# Patient Record
Sex: MALE | Race: WHITE | NOT HISPANIC OR LATINO | Employment: FULL TIME | URBAN - METROPOLITAN AREA
[De-identification: names, ages, dates, MRNs, and addresses within clinical notes are randomized per-mention and may not be internally consistent; named-entity substitution may affect disease eponyms.]

---

## 2024-01-12 ENCOUNTER — APPOINTMENT (OUTPATIENT)
Dept: CARDIOLOGY | Facility: HOSPITAL | Age: 44
End: 2024-01-12
Payer: COMMERCIAL

## 2024-01-12 ENCOUNTER — APPOINTMENT (OUTPATIENT)
Dept: RADIOLOGY | Facility: HOSPITAL | Age: 44
End: 2024-01-12
Payer: COMMERCIAL

## 2024-01-12 LAB
ALBUMIN SERPL-MCNC: 4.1 G/DL (ref 3.5–5)
ALP BLD-CCNC: 113 U/L (ref 35–125)
ALT SERPL-CCNC: 23 U/L (ref 5–40)
ANION GAP SERPL CALC-SCNC: 13 MMOL/L
AST SERPL-CCNC: 19 U/L (ref 5–40)
BASOPHILS # BLD AUTO: 0.09 X10*3/UL (ref 0–0.1)
BASOPHILS NFR BLD AUTO: 0.8 %
BILIRUB SERPL-MCNC: <0.2 MG/DL (ref 0.1–1.2)
BUN SERPL-MCNC: 20 MG/DL (ref 8–25)
CALCIUM SERPL-MCNC: 9.6 MG/DL (ref 8.5–10.4)
CHLORIDE SERPL-SCNC: 99 MMOL/L (ref 97–107)
CO2 SERPL-SCNC: 23 MMOL/L (ref 24–31)
CREAT SERPL-MCNC: 1.1 MG/DL (ref 0.4–1.6)
EGFRCR SERPLBLD CKD-EPI 2021: 85 ML/MIN/1.73M*2
EOSINOPHIL # BLD AUTO: 0.23 X10*3/UL (ref 0–0.7)
EOSINOPHIL NFR BLD AUTO: 2.1 %
ERYTHROCYTE [DISTWIDTH] IN BLOOD BY AUTOMATED COUNT: 11.9 % (ref 11.5–14.5)
FLUAV RNA RESP QL NAA+PROBE: NOT DETECTED
FLUBV RNA RESP QL NAA+PROBE: NOT DETECTED
GLUCOSE SERPL-MCNC: 125 MG/DL (ref 65–99)
HCT VFR BLD AUTO: 45.4 % (ref 41–52)
HGB BLD-MCNC: 16.2 G/DL (ref 13.5–17.5)
IMM GRANULOCYTES # BLD AUTO: 0.03 X10*3/UL (ref 0–0.7)
IMM GRANULOCYTES NFR BLD AUTO: 0.3 % (ref 0–0.9)
LACTATE BLDV-SCNC: 1.4 MMOL/L (ref 0.4–2)
LYMPHOCYTES # BLD AUTO: 3.33 X10*3/UL (ref 1.2–4.8)
LYMPHOCYTES NFR BLD AUTO: 29.9 %
MCH RBC QN AUTO: 32.3 PG (ref 26–34)
MCHC RBC AUTO-ENTMCNC: 35.7 G/DL (ref 32–36)
MCV RBC AUTO: 90 FL (ref 80–100)
MONOCYTES # BLD AUTO: 0.76 X10*3/UL (ref 0.1–1)
MONOCYTES NFR BLD AUTO: 6.8 %
NEUTROPHILS # BLD AUTO: 6.71 X10*3/UL (ref 1.2–7.7)
NEUTROPHILS NFR BLD AUTO: 60.1 %
NRBC BLD-RTO: 0 /100 WBCS (ref 0–0)
PLATELET # BLD AUTO: 381 X10*3/UL (ref 150–450)
POTASSIUM SERPL-SCNC: 3.8 MMOL/L (ref 3.4–5.1)
PROT SERPL-MCNC: 7.5 G/DL (ref 5.9–7.9)
RBC # BLD AUTO: 5.02 X10*6/UL (ref 4.5–5.9)
RSV RNA RESP QL NAA+PROBE: NOT DETECTED
SARS-COV-2 RNA RESP QL NAA+PROBE: NOT DETECTED
SODIUM SERPL-SCNC: 135 MMOL/L (ref 133–145)
TROPONIN T SERPL-MCNC: 7 NG/L
WBC # BLD AUTO: 11.2 X10*3/UL (ref 4.4–11.3)

## 2024-01-12 PROCEDURE — 93005 ELECTROCARDIOGRAM TRACING: CPT

## 2024-01-12 PROCEDURE — 2500000004 HC RX 250 GENERAL PHARMACY W/ HCPCS (ALT 636 FOR OP/ED): Performed by: EMERGENCY MEDICINE

## 2024-01-12 PROCEDURE — 87637 SARSCOV2&INF A&B&RSV AMP PRB: CPT | Performed by: EMERGENCY MEDICINE

## 2024-01-12 PROCEDURE — 94640 AIRWAY INHALATION TREATMENT: CPT | Mod: 59

## 2024-01-12 PROCEDURE — 80053 COMPREHEN METABOLIC PANEL: CPT | Performed by: EMERGENCY MEDICINE

## 2024-01-12 PROCEDURE — 71046 X-RAY EXAM CHEST 2 VIEWS: CPT

## 2024-01-12 PROCEDURE — 85025 COMPLETE CBC W/AUTO DIFF WBC: CPT | Performed by: EMERGENCY MEDICINE

## 2024-01-12 PROCEDURE — 83605 ASSAY OF LACTIC ACID: CPT | Performed by: EMERGENCY MEDICINE

## 2024-01-12 PROCEDURE — 87637 SARSCOV2&INF A&B&RSV AMP PRB: CPT | Performed by: STUDENT IN AN ORGANIZED HEALTH CARE EDUCATION/TRAINING PROGRAM

## 2024-01-12 PROCEDURE — 71046 X-RAY EXAM CHEST 2 VIEWS: CPT | Mod: FOREIGN READ | Performed by: RADIOLOGY

## 2024-01-12 PROCEDURE — 36415 COLL VENOUS BLD VENIPUNCTURE: CPT | Performed by: EMERGENCY MEDICINE

## 2024-01-12 PROCEDURE — 84484 ASSAY OF TROPONIN QUANT: CPT | Performed by: EMERGENCY MEDICINE

## 2024-01-12 PROCEDURE — 87040 BLOOD CULTURE FOR BACTERIA: CPT | Mod: WESLAB | Performed by: EMERGENCY MEDICINE

## 2024-01-12 PROCEDURE — 99285 EMERGENCY DEPT VISIT HI MDM: CPT | Mod: 25

## 2024-01-12 PROCEDURE — 2500000001 HC RX 250 WO HCPCS SELF ADMINISTERED DRUGS (ALT 637 FOR MEDICARE OP): Performed by: STUDENT IN AN ORGANIZED HEALTH CARE EDUCATION/TRAINING PROGRAM

## 2024-01-12 PROCEDURE — 99284 EMERGENCY DEPT VISIT MOD MDM: CPT | Performed by: EMERGENCY MEDICINE

## 2024-01-12 RX ORDER — ACETAMINOPHEN 500 MG
1000 TABLET ORAL ONCE
Status: COMPLETED | OUTPATIENT
Start: 2024-01-12 | End: 2024-01-12

## 2024-01-12 RX ADMIN — ACETAMINOPHEN 1000 MG: 500 TABLET ORAL at 19:41

## 2024-01-12 RX ADMIN — PIPERACILLIN SODIUM AND TAZOBACTAM SODIUM 4.5 G: 4; .5 INJECTION, SOLUTION INTRAVENOUS at 22:58

## 2024-01-12 RX ADMIN — DOXYCYCLINE 100 MG: 100 INJECTION, POWDER, LYOPHILIZED, FOR SOLUTION INTRAVENOUS at 23:37

## 2024-01-12 ASSESSMENT — PAIN SCALES - GENERAL
PAINLEVEL_OUTOF10: 4
PAINLEVEL_OUTOF10: 5 - MODERATE PAIN

## 2024-01-12 ASSESSMENT — PAIN - FUNCTIONAL ASSESSMENT
PAIN_FUNCTIONAL_ASSESSMENT: 0-10
PAIN_FUNCTIONAL_ASSESSMENT: 0-10

## 2024-01-12 ASSESSMENT — PAIN SCALES - PAIN ASSESSMENT IN ADVANCED DEMENTIA (PAINAD)
BREATHING: NORMAL
CONSOLABILITY: NO NEED TO CONSOLE
FACIALEXPRESSION: SMILING OR INEXPRESSIVE

## 2024-01-12 ASSESSMENT — PAIN DESCRIPTION - LOCATION: LOCATION: CHEST

## 2024-01-12 ASSESSMENT — PAIN DESCRIPTION - DESCRIPTORS: DESCRIPTORS: ACHING

## 2024-01-12 ASSESSMENT — PAIN SCALES - WONG BAKER: WONGBAKER_NUMERICALRESPONSE: NO HURT

## 2024-01-13 ENCOUNTER — CLINICAL SUPPORT (OUTPATIENT)
Dept: CARDIOLOGY | Facility: CLINIC | Age: 44
End: 2024-01-13
Payer: COMMERCIAL

## 2024-01-13 ENCOUNTER — HOSPITAL ENCOUNTER (EMERGENCY)
Facility: HOSPITAL | Age: 44
Discharge: HOME | End: 2024-01-13
Attending: EMERGENCY MEDICINE
Payer: COMMERCIAL

## 2024-01-13 ENCOUNTER — APPOINTMENT (OUTPATIENT)
Dept: CARDIOLOGY | Facility: HOSPITAL | Age: 44
End: 2024-01-13
Payer: COMMERCIAL

## 2024-01-13 ENCOUNTER — APPOINTMENT (OUTPATIENT)
Dept: RADIOLOGY | Facility: HOSPITAL | Age: 44
End: 2024-01-13
Payer: COMMERCIAL

## 2024-01-13 VITALS
OXYGEN SATURATION: 95 % | WEIGHT: 210 LBS | HEIGHT: 70 IN | BODY MASS INDEX: 30.06 KG/M2 | RESPIRATION RATE: 18 BRPM | HEART RATE: 74 BPM | TEMPERATURE: 100.6 F | SYSTOLIC BLOOD PRESSURE: 125 MMHG | DIASTOLIC BLOOD PRESSURE: 75 MMHG

## 2024-01-13 DIAGNOSIS — J18.9 PNEUMONIA OF LEFT LUNG DUE TO INFECTIOUS ORGANISM, UNSPECIFIED PART OF LUNG: Primary | ICD-10-CM

## 2024-01-13 DIAGNOSIS — J45.20 MILD INTERMITTENT REACTIVE AIRWAY DISEASE WITHOUT COMPLICATION (HHS-HCC): ICD-10-CM

## 2024-01-13 DIAGNOSIS — R07.9 CHEST PAIN, UNSPECIFIED TYPE: ICD-10-CM

## 2024-01-13 DIAGNOSIS — Z72.0 TOBACCO ABUSE: ICD-10-CM

## 2024-01-13 LAB — TROPONIN T SERPL-MCNC: 8 NG/L

## 2024-01-13 PROCEDURE — 71275 CT ANGIOGRAPHY CHEST: CPT | Mod: FOREIGN READ | Performed by: RADIOLOGY

## 2024-01-13 PROCEDURE — 93005 ELECTROCARDIOGRAM TRACING: CPT

## 2024-01-13 PROCEDURE — 96361 HYDRATE IV INFUSION ADD-ON: CPT

## 2024-01-13 PROCEDURE — 2500000002 HC RX 250 W HCPCS SELF ADMINISTERED DRUGS (ALT 637 FOR MEDICARE OP, ALT 636 FOR OP/ED): Performed by: EMERGENCY MEDICINE

## 2024-01-13 PROCEDURE — 96374 THER/PROPH/DIAG INJ IV PUSH: CPT | Mod: 59

## 2024-01-13 PROCEDURE — 2550000001 HC RX 255 CONTRASTS: Performed by: EMERGENCY MEDICINE

## 2024-01-13 PROCEDURE — 2500000004 HC RX 250 GENERAL PHARMACY W/ HCPCS (ALT 636 FOR OP/ED): Performed by: EMERGENCY MEDICINE

## 2024-01-13 PROCEDURE — 2500000001 HC RX 250 WO HCPCS SELF ADMINISTERED DRUGS (ALT 637 FOR MEDICARE OP): Performed by: EMERGENCY MEDICINE

## 2024-01-13 PROCEDURE — 71275 CT ANGIOGRAPHY CHEST: CPT

## 2024-01-13 PROCEDURE — 96375 TX/PRO/DX INJ NEW DRUG ADDON: CPT | Mod: 59

## 2024-01-13 PROCEDURE — 84484 ASSAY OF TROPONIN QUANT: CPT | Performed by: EMERGENCY MEDICINE

## 2024-01-13 PROCEDURE — 36415 COLL VENOUS BLD VENIPUNCTURE: CPT | Performed by: EMERGENCY MEDICINE

## 2024-01-13 RX ORDER — IPRATROPIUM BROMIDE AND ALBUTEROL SULFATE 2.5; .5 MG/3ML; MG/3ML
3 SOLUTION RESPIRATORY (INHALATION) ONCE
Status: COMPLETED | OUTPATIENT
Start: 2024-01-13 | End: 2024-01-13

## 2024-01-13 RX ORDER — DEXAMETHASONE SODIUM PHOSPHATE 100 MG/10ML
10 INJECTION INTRAMUSCULAR; INTRAVENOUS ONCE
Status: COMPLETED | OUTPATIENT
Start: 2024-01-13 | End: 2024-01-13

## 2024-01-13 RX ORDER — METHYLPREDNISOLONE 4 MG/1
TABLET ORAL
Qty: 21 TABLET | Refills: 0 | Status: SHIPPED | OUTPATIENT
Start: 2024-01-13 | End: 2024-01-20

## 2024-01-13 RX ORDER — DOXYCYCLINE 100 MG/1
100 CAPSULE ORAL ONCE
Status: COMPLETED | OUTPATIENT
Start: 2024-01-13 | End: 2024-01-13

## 2024-01-13 RX ORDER — KETOROLAC TROMETHAMINE 30 MG/ML
15 INJECTION, SOLUTION INTRAMUSCULAR; INTRAVENOUS ONCE
Status: COMPLETED | OUTPATIENT
Start: 2024-01-13 | End: 2024-01-13

## 2024-01-13 RX ORDER — DOXYCYCLINE 100 MG/1
100 CAPSULE ORAL 2 TIMES DAILY
Qty: 20 CAPSULE | Refills: 0 | Status: SHIPPED | OUTPATIENT
Start: 2024-01-13 | End: 2024-01-23

## 2024-01-13 RX ORDER — ALBUTEROL SULFATE 90 UG/1
2 AEROSOL, METERED RESPIRATORY (INHALATION) EVERY 4 HOURS PRN
Qty: 18 G | Refills: 0 | Status: SHIPPED | OUTPATIENT
Start: 2024-01-13 | End: 2024-02-12

## 2024-01-13 RX ADMIN — DOXYCYCLINE HYCLATE 100 MG: 100 CAPSULE ORAL at 05:59

## 2024-01-13 RX ADMIN — IPRATROPIUM BROMIDE AND ALBUTEROL SULFATE 3 ML: 2.5; .5 SOLUTION RESPIRATORY (INHALATION) at 03:21

## 2024-01-13 RX ADMIN — IOHEXOL 75 ML: 350 INJECTION, SOLUTION INTRAVENOUS at 04:01

## 2024-01-13 RX ADMIN — KETOROLAC TROMETHAMINE 15 MG: 30 INJECTION INTRAMUSCULAR; INTRAVENOUS at 03:21

## 2024-01-13 RX ADMIN — SODIUM CHLORIDE, SODIUM LACTATE, POTASSIUM CHLORIDE, AND CALCIUM CHLORIDE 1000 ML: 600; 310; 30; 20 INJECTION, SOLUTION INTRAVENOUS at 03:21

## 2024-01-13 RX ADMIN — DEXAMETHASONE SODIUM PHOSPHATE 10 MG: 10 INJECTION INTRAMUSCULAR; INTRAVENOUS at 03:21

## 2024-01-13 ASSESSMENT — PAIN SCALES - GENERAL: PAINLEVEL_OUTOF10: 2

## 2024-01-13 ASSESSMENT — HEART SCORE
HEART SCORE: 2
TROPONIN: LESS THAN OR EQUAL TO NORMAL LIMIT
HISTORY: SLIGHTLY SUSPICIOUS
RISK FACTORS: 1-2 RISK FACTORS
AGE: <45
ECG: NON-SPECIFIC REPOLARIZATION DISTURBANCE

## 2024-01-13 NOTE — ED TRIAGE NOTES
TRIAGE NOTE   I saw the patient as the Clinician in Triage and performed a brief history and physical exam, established acuity, and ordered appropriate tests to develop basic plan of care. Patient will be seen by an [facility], resident and/or physician who will independently evaluate the patient. Please see subsequent provider notes for further details and disposition.     Brief HPI: In brief, Cristobal Singh is a 43 y.o. male that presents for evaluation of chest pain and shortness of breath.  The patient reports a persistent cough for the past 2 weeks.  Cough has been productive of reddish sputum.  He was unaware of any fevers but is found to be febrile here.  Over the past couple of days he has been feeling short of breath and he reports over the past several days constant pain across his chest.  He is a smoker.    Focused Physical exam:   Triage vitals markable for fever and tachycardia as well as tachypnea.  The heart rate is 123 with regular rhythm and no murmurs.  Auscultation of lungs reveal scattered inspiratory and expiratory wheezing that partially clear with cough and deep breath.  Oxygen saturation is marginal at 94%.  Abdomen is soft and nondistended and nontender to palpation.    Plan/MDM:   Plan is for EKG, IV fluids, labs and chest x-ray.  Viral testing also ordered.  Please see subsequent provider note for further details and disposition     Bird Tirado D.O.  7:51 PM

## 2024-01-13 NOTE — DISCHARGE INSTRUCTIONS
Drink fluids.  You can take 2 Motrin or 2 extra strength Tylenol every 6 hours for pain body aches and fever.    Take antibiotics (doxycycline) for 10 days.  Finish the Medrol Dosepak.    Use albuterol inhaler with aero chamber (spacer) every 4-6 hours for cough and/or wheezing.    To the ER if your symptoms do not get better by Monday including difficulty breathing, continued fever or new symptoms like vomiting that you did not have today.

## 2024-01-13 NOTE — Clinical Note
Cristobal Samantha was seen and treated in our emergency department on 1/12/2024.  He may return to work on 01/15/2024.       If you have any questions or concerns, please don't hesitate to call.      Heraclio Dubois MD

## 2024-01-13 NOTE — ED PROVIDER NOTES
HPI   Chief Complaint   Patient presents with    Influenza     Cough cold congestion, chest pressure for 2 weeks.  Active fever.        43-year-old male presents ER with cough, wheezing, not feeling well for 3 weeks cough is productive for pink sputum.  No hematemesis..  Patient smokes.  Denies history of lung disease including asthma COPD or emphysema.  Denies history of CHF.  No leg swelling.  No history of PE or DVT.  No risk factors for PE or DVT including recent surgery, immobilization or cancer.    Patient has had midsternal chest pain for 3 weeks.  Pain is intermittent.  Nonexertional.  Worse with cough.  No history of high cholesterol, hypertension, diabetes.  No cocaine.    Patient has had chills.  No fever except for today at triage.  No nausea vomiting or diarrhea.  No abdominal pain.    Patient has noticed wheezing.  Usually does not have wheezing.  Wheezing present for several days.  Patient has body aches and generalized fatigue.  No headache.  No neck pain.    Patient symptoms been present for 3 weeks.  Worse over the last several days.          History provided by:  Patient                      [place] Coma Scale Score: 15   HEART Score: 2                Patient History   No past medical history on file.  No past surgical history on file.  No family history on file.  Social History     Tobacco Use    Smoking status: Not on file    Smokeless tobacco: Not on file   Substance Use Topics    Alcohol use: Not on file    Drug use: Not on file       Physical Exam   ED Triage Vitals [01/12/24 1933]   Temp Heart Rate Resp BP   38.1 °C (100.6 °F) (!) 123 24 --      SpO2 Temp Source Heart Rate Source Patient Position   94 % Tympanic Monitor --      BP Location FiO2 (%)     -- --       Physical Exam  Vitals and nursing note reviewed.     Constitutional: Well appearing and hydrated. Awake & alert. No acute distress.  Head: Atraumatic.  Eyes: Sclerae are anicteric and not injected.  ENT: Airway is patent.  Neck:  Neck is supple and non-tender. No stridor.  Cardiovascular: Regular rate.  Tachycardia at triage resolved  Pulmonary/Chest: Slight wheezing bilaterally.  Left greater than right.  95% on room air, no hypoxia.  GI: Soft and non-distended. There is no discomfort with palpation.   Extremities: Full range of motion in all extremities and no deformity.  No pedal edema.  No calf tenderness or swelling.  Neurological: Alert, awake.  Moving all extremities.  Skin: Skin is warm and dry.  Psychiatric: Cooperative.    EKG:  interpreted by me, Emergency Department Physician    1.  Sinus tachycardia 122, no ST elevations, QTc 413, no prior  2.  Normal sinus rhythm 84, no ST elevations, decreased heart rate by 40 compared to prior today.    ED Course & MDM   ED Course as of 01/13/24 0607   Fri Jan 12, 2024 1944 My independent interpretation of his EKG shows sinus tachycardia with rate of 122 beats minute.  Rightward axis, QTc is 413 ms, IL interval is 128.  No ST elevation or depression, no acute ischemic pattern.  No STEMI. [NT]   Sat Jan 13, 2024   0258 Troponin negative.  RSV influenza COVID-negative.  White count 11.2, hemoglobin is 16.2.  Electrolytes normal except for bicarb 23 otherwise LFTs and basic metabolic panel negative. [RF]   0330 Tachycardia improved.  No distress.  Patient given IV antibiotics IV fluids and Tylenol with improvement of symptoms. [RF]      ED Course User Index  [NT] Jay Delvalle DO  [RF] Heraclio Dubois MD         Diagnoses as of 01/13/24 0607   Chest pain, unspecified type   Pneumonia of left lung due to infectious organism, unspecified part of lung   Mild intermittent reactive airway disease without complication   Tobacco abuse       Medical Decision Making  Pneumonia: Patient with 3 weeks of cough and now fever and wheezing consistent with pneumonia.  COVID and influenza negative.  Chest x-ray showed possible pneumonia confirmed by CT.  Obtain CT because of patient's new  wheezing, tachycardia and concern for severe infection.  CT confirmed pneumonia mostly in the left lobe.  This is consistent with physical exam and wheezing on the left side.  Patient had symptoms of reactive airway disease with no prior history of asthma or COPD.  Patient smokes.  Patient's wheezing resolved after nebs and steroids.  Patient's abnormal vital signs resolved after treatment of antipyretics and IV fluids.  No tachycardia.  No tachypnea.  No hypoxia.    When patient originally arrived he was febrile and had a heart rate in the 130s.  That resolved after antipyretics.  Meets SIRS criteria for sepsis.  Lactate normal less than 2.  No hypotension.  White count normal.  BMP normal.  No metabolic or electrolyte abnormalities.    Overall after treatment the ER patient is a good candidate for outpatient treatment since he is vitals are normal.  He has no difficulty breathing.  He is able to take oral medications and appears well-hydrated.  Extensively discussed the risks and benefits of going home.  Patient wants to go home and will continue to take oral antibiotics, steroids, use nebs for cough and wheezing.  Offered inpatient admission because of initial vital signs and new reactive airway disease.  Patient declined admission which is reasonable given his current benign physical exam and vitals.      Chest pain: Elated for his chest pain which has been present for 3 weeks.  EKG does not show signs of ischemia.  Troponins are negative.  Heart score is 2.  Pain is worse with coughing.  CT did not show any abnormalities except for pneumonia.  No congestive heart failure or PE.  Chest pain is likely secondary to cough and will improve with oral pain medications including Motrin and Tylenol and improvement of cough with nebs and steroids.    Tobacco abuse: Patient is a smoker and now has reactive airway disease.  Recommended he stop smoking.  Gave patient information on smoking cessation.    I provided at least  75 minutes of critical care time in the treatment of this patient not including teaching or procedures. Patient was emergently evaluated for potential life-threatening conditions to prevent further decompensation, injury, or death. Critical care time included history, physical exam, interpretation of vitals, labs and imaging, discussion of patient with other physicians and frequent monitoring.  Patient evaluated for difficulty breathing, fever, tachycardia possible sepsis.  Treated initially with broad-spectrum antibiotics and IV fluids appropriate for sepsis.  Given nebs and steroids.  Patient's imaging and labs reviewed.  Discussed test results with patient and treatment in the ER and at home.  Patient stable with normal vitals.        Procedure  Procedures     Heraclio Dubois MD  01/13/24 0607

## 2024-01-14 ENCOUNTER — HOSPITAL ENCOUNTER (EMERGENCY)
Facility: HOSPITAL | Age: 44
Discharge: HOME | End: 2024-01-14
Attending: STUDENT IN AN ORGANIZED HEALTH CARE EDUCATION/TRAINING PROGRAM
Payer: COMMERCIAL

## 2024-01-14 ENCOUNTER — APPOINTMENT (OUTPATIENT)
Dept: RADIOLOGY | Facility: HOSPITAL | Age: 44
End: 2024-01-14
Payer: COMMERCIAL

## 2024-01-14 ENCOUNTER — APPOINTMENT (OUTPATIENT)
Dept: CARDIOLOGY | Facility: HOSPITAL | Age: 44
End: 2024-01-14
Payer: COMMERCIAL

## 2024-01-14 VITALS
DIASTOLIC BLOOD PRESSURE: 74 MMHG | WEIGHT: 210 LBS | RESPIRATION RATE: 20 BRPM | HEIGHT: 70 IN | BODY MASS INDEX: 30.06 KG/M2 | OXYGEN SATURATION: 98 % | TEMPERATURE: 99.5 F | HEART RATE: 88 BPM | SYSTOLIC BLOOD PRESSURE: 118 MMHG

## 2024-01-14 DIAGNOSIS — J18.9 PNEUMONIA DUE TO INFECTIOUS ORGANISM, UNSPECIFIED LATERALITY, UNSPECIFIED PART OF LUNG: ICD-10-CM

## 2024-01-14 DIAGNOSIS — R07.9 CHEST PAIN, UNSPECIFIED TYPE: Primary | ICD-10-CM

## 2024-01-14 LAB
ALBUMIN SERPL-MCNC: 4.1 G/DL (ref 3.5–5)
ALP BLD-CCNC: 98 U/L (ref 35–125)
ALT SERPL-CCNC: 21 U/L (ref 5–40)
ANION GAP SERPL CALC-SCNC: 12 MMOL/L
AST SERPL-CCNC: 14 U/L (ref 5–40)
BASOPHILS # BLD AUTO: 0.09 X10*3/UL (ref 0–0.1)
BASOPHILS NFR BLD AUTO: 0.7 %
BILIRUB SERPL-MCNC: <0.2 MG/DL (ref 0.1–1.2)
BUN SERPL-MCNC: 16 MG/DL (ref 8–25)
CALCIUM SERPL-MCNC: 9.4 MG/DL (ref 8.5–10.4)
CHLORIDE SERPL-SCNC: 100 MMOL/L (ref 97–107)
CO2 SERPL-SCNC: 25 MMOL/L (ref 24–31)
CREAT SERPL-MCNC: 0.9 MG/DL (ref 0.4–1.6)
EGFRCR SERPLBLD CKD-EPI 2021: >90 ML/MIN/1.73M*2
EOSINOPHIL # BLD AUTO: 0.12 X10*3/UL (ref 0–0.7)
EOSINOPHIL NFR BLD AUTO: 1 %
ERYTHROCYTE [DISTWIDTH] IN BLOOD BY AUTOMATED COUNT: 12.3 % (ref 11.5–14.5)
GLUCOSE SERPL-MCNC: 155 MG/DL (ref 65–99)
HCT VFR BLD AUTO: 44.1 % (ref 41–52)
HGB BLD-MCNC: 15.3 G/DL (ref 13.5–17.5)
IMM GRANULOCYTES # BLD AUTO: 0.04 X10*3/UL (ref 0–0.7)
IMM GRANULOCYTES NFR BLD AUTO: 0.3 % (ref 0–0.9)
LACTATE BLDV-SCNC: 1.6 MMOL/L (ref 0.4–2)
LYMPHOCYTES # BLD AUTO: 3.34 X10*3/UL (ref 1.2–4.8)
LYMPHOCYTES NFR BLD AUTO: 27.5 %
MAGNESIUM SERPL-MCNC: 2.1 MG/DL (ref 1.6–3.1)
MCH RBC QN AUTO: 31.8 PG (ref 26–34)
MCHC RBC AUTO-ENTMCNC: 34.7 G/DL (ref 32–36)
MCV RBC AUTO: 92 FL (ref 80–100)
MONOCYTES # BLD AUTO: 0.53 X10*3/UL (ref 0.1–1)
MONOCYTES NFR BLD AUTO: 4.4 %
NEUTROPHILS # BLD AUTO: 8.01 X10*3/UL (ref 1.2–7.7)
NEUTROPHILS NFR BLD AUTO: 66.1 %
NRBC BLD-RTO: 0 /100 WBCS (ref 0–0)
NT-PROBNP SERPL-MCNC: 54 PG/ML (ref 0–93)
PLATELET # BLD AUTO: 397 X10*3/UL (ref 150–450)
POTASSIUM SERPL-SCNC: 3.4 MMOL/L (ref 3.4–5.1)
PROT SERPL-MCNC: 7.3 G/DL (ref 5.9–7.9)
RBC # BLD AUTO: 4.81 X10*6/UL (ref 4.5–5.9)
SODIUM SERPL-SCNC: 137 MMOL/L (ref 133–145)
TROPONIN T SERPL-MCNC: 8 NG/L
WBC # BLD AUTO: 12.1 X10*3/UL (ref 4.4–11.3)

## 2024-01-14 PROCEDURE — 96361 HYDRATE IV INFUSION ADD-ON: CPT

## 2024-01-14 PROCEDURE — 83880 ASSAY OF NATRIURETIC PEPTIDE: CPT | Performed by: STUDENT IN AN ORGANIZED HEALTH CARE EDUCATION/TRAINING PROGRAM

## 2024-01-14 PROCEDURE — 2500000002 HC RX 250 W HCPCS SELF ADMINISTERED DRUGS (ALT 637 FOR MEDICARE OP, ALT 636 FOR OP/ED): Performed by: STUDENT IN AN ORGANIZED HEALTH CARE EDUCATION/TRAINING PROGRAM

## 2024-01-14 PROCEDURE — 80053 COMPREHEN METABOLIC PANEL: CPT | Performed by: STUDENT IN AN ORGANIZED HEALTH CARE EDUCATION/TRAINING PROGRAM

## 2024-01-14 PROCEDURE — 85025 COMPLETE CBC W/AUTO DIFF WBC: CPT | Performed by: STUDENT IN AN ORGANIZED HEALTH CARE EDUCATION/TRAINING PROGRAM

## 2024-01-14 PROCEDURE — 99284 EMERGENCY DEPT VISIT MOD MDM: CPT | Performed by: STUDENT IN AN ORGANIZED HEALTH CARE EDUCATION/TRAINING PROGRAM

## 2024-01-14 PROCEDURE — 84484 ASSAY OF TROPONIN QUANT: CPT | Performed by: STUDENT IN AN ORGANIZED HEALTH CARE EDUCATION/TRAINING PROGRAM

## 2024-01-14 PROCEDURE — 83605 ASSAY OF LACTIC ACID: CPT | Performed by: STUDENT IN AN ORGANIZED HEALTH CARE EDUCATION/TRAINING PROGRAM

## 2024-01-14 PROCEDURE — 93005 ELECTROCARDIOGRAM TRACING: CPT

## 2024-01-14 PROCEDURE — 36415 COLL VENOUS BLD VENIPUNCTURE: CPT | Performed by: STUDENT IN AN ORGANIZED HEALTH CARE EDUCATION/TRAINING PROGRAM

## 2024-01-14 PROCEDURE — 96374 THER/PROPH/DIAG INJ IV PUSH: CPT

## 2024-01-14 PROCEDURE — 71046 X-RAY EXAM CHEST 2 VIEWS: CPT

## 2024-01-14 PROCEDURE — 2500000001 HC RX 250 WO HCPCS SELF ADMINISTERED DRUGS (ALT 637 FOR MEDICARE OP): Performed by: STUDENT IN AN ORGANIZED HEALTH CARE EDUCATION/TRAINING PROGRAM

## 2024-01-14 PROCEDURE — 94640 AIRWAY INHALATION TREATMENT: CPT

## 2024-01-14 PROCEDURE — 83735 ASSAY OF MAGNESIUM: CPT | Performed by: STUDENT IN AN ORGANIZED HEALTH CARE EDUCATION/TRAINING PROGRAM

## 2024-01-14 PROCEDURE — 2500000004 HC RX 250 GENERAL PHARMACY W/ HCPCS (ALT 636 FOR OP/ED): Performed by: STUDENT IN AN ORGANIZED HEALTH CARE EDUCATION/TRAINING PROGRAM

## 2024-01-14 RX ORDER — IPRATROPIUM BROMIDE AND ALBUTEROL SULFATE 2.5; .5 MG/3ML; MG/3ML
3 SOLUTION RESPIRATORY (INHALATION) ONCE
Status: COMPLETED | OUTPATIENT
Start: 2024-01-14 | End: 2024-01-14

## 2024-01-14 RX ORDER — ACETAMINOPHEN AND CODEINE PHOSPHATE 300; 30 MG/1; MG/1
1 TABLET ORAL ONCE
Status: COMPLETED | OUTPATIENT
Start: 2024-01-14 | End: 2024-01-14

## 2024-01-14 RX ORDER — KETOROLAC TROMETHAMINE 30 MG/ML
15 INJECTION, SOLUTION INTRAMUSCULAR; INTRAVENOUS ONCE
Status: COMPLETED | OUTPATIENT
Start: 2024-01-14 | End: 2024-01-14

## 2024-01-14 RX ORDER — AMOXICILLIN AND CLAVULANATE POTASSIUM 875; 125 MG/1; MG/1
1 TABLET, FILM COATED ORAL ONCE
Status: COMPLETED | OUTPATIENT
Start: 2024-01-14 | End: 2024-01-14

## 2024-01-14 RX ORDER — AMOXICILLIN AND CLAVULANATE POTASSIUM 875; 125 MG/1; MG/1
1 TABLET, FILM COATED ORAL EVERY 12 HOURS
Qty: 13 TABLET | Refills: 0 | Status: SHIPPED | OUTPATIENT
Start: 2024-01-14 | End: 2024-01-21

## 2024-01-14 RX ORDER — ACETAMINOPHEN AND CODEINE PHOSPHATE 300; 30 MG/1; MG/1
1 TABLET ORAL EVERY 6 HOURS PRN
Qty: 12 TABLET | Refills: 0 | Status: SHIPPED | OUTPATIENT
Start: 2024-01-14 | End: 2024-01-17

## 2024-01-14 RX ADMIN — SODIUM CHLORIDE 1000 ML: 900 INJECTION, SOLUTION INTRAVENOUS at 19:29

## 2024-01-14 RX ADMIN — AMOXICILLIN AND CLAVULANATE POTASSIUM 875 MG: 875; 125 TABLET, FILM COATED ORAL at 20:39

## 2024-01-14 RX ADMIN — ACETAMINOPHEN AND CODEINE PHOSPHATE 1 TABLET: 300; 30 TABLET ORAL at 19:30

## 2024-01-14 RX ADMIN — IPRATROPIUM BROMIDE AND ALBUTEROL SULFATE 3 ML: .5; 2.5 SOLUTION RESPIRATORY (INHALATION) at 20:13

## 2024-01-14 RX ADMIN — KETOROLAC TROMETHAMINE 15 MG: 30 INJECTION INTRAMUSCULAR; INTRAVENOUS at 19:30

## 2024-01-14 ASSESSMENT — PAIN DESCRIPTION - PROGRESSION: CLINICAL_PROGRESSION: NOT CHANGED

## 2024-01-14 ASSESSMENT — PAIN DESCRIPTION - ONSET: ONSET: ONGOING

## 2024-01-14 ASSESSMENT — PAIN DESCRIPTION - DESCRIPTORS
DESCRIPTORS: PRESSURE

## 2024-01-14 ASSESSMENT — COLUMBIA-SUICIDE SEVERITY RATING SCALE - C-SSRS
2. HAVE YOU ACTUALLY HAD ANY THOUGHTS OF KILLING YOURSELF?: NO
1. IN THE PAST MONTH, HAVE YOU WISHED YOU WERE DEAD OR WISHED YOU COULD GO TO SLEEP AND NOT WAKE UP?: NO
6. HAVE YOU EVER DONE ANYTHING, STARTED TO DO ANYTHING, OR PREPARED TO DO ANYTHING TO END YOUR LIFE?: NO

## 2024-01-14 ASSESSMENT — LIFESTYLE VARIABLES
HAVE PEOPLE ANNOYED YOU BY CRITICIZING YOUR DRINKING: NO
EVER HAD A DRINK FIRST THING IN THE MORNING TO STEADY YOUR NERVES TO GET RID OF A HANGOVER: NO
HAVE YOU EVER FELT YOU SHOULD CUT DOWN ON YOUR DRINKING: NO
EVER FELT BAD OR GUILTY ABOUT YOUR DRINKING: NO
REASON UNABLE TO ASSESS: NO

## 2024-01-14 ASSESSMENT — PAIN - FUNCTIONAL ASSESSMENT
PAIN_FUNCTIONAL_ASSESSMENT: 0-10
PAIN_FUNCTIONAL_ASSESSMENT: 0-10

## 2024-01-14 ASSESSMENT — PAIN DESCRIPTION - LOCATION: LOCATION: CHEST

## 2024-01-14 ASSESSMENT — PAIN DESCRIPTION - PAIN TYPE: TYPE: ACUTE PAIN

## 2024-01-14 ASSESSMENT — PAIN SCALES - GENERAL
PAINLEVEL_OUTOF10: 7
PAINLEVEL_OUTOF10: 4
PAINLEVEL_OUTOF10: 7

## 2024-01-14 ASSESSMENT — PAIN DESCRIPTION - FREQUENCY: FREQUENCY: CONSTANT/CONTINUOUS

## 2024-01-14 NOTE — Clinical Note
Cristobal Samantha was seen and treated in our emergency department on 1/14/2024.  He may return to work on 01/17/2024.       If you have any questions or concerns, please don't hesitate to call.      John Bland MD

## 2024-01-15 LAB
ATRIAL RATE: 102 BPM
ATRIAL RATE: 122 BPM
ATRIAL RATE: 84 BPM
P AXIS: 60 DEGREES
P AXIS: 67 DEGREES
P AXIS: 77 DEGREES
P OFFSET: 198 MS
P OFFSET: 202 MS
P OFFSET: 208 MS
P ONSET: 147 MS
P ONSET: 153 MS
P ONSET: 160 MS
PR INTERVAL: 128 MS
PR INTERVAL: 138 MS
PR INTERVAL: 154 MS
Q ONSET: 222 MS
Q ONSET: 224 MS
Q ONSET: 224 MS
QRS COUNT: 14 BEATS
QRS COUNT: 17 BEATS
QRS COUNT: 20 BEATS
QRS DURATION: 84 MS
QRS DURATION: 90 MS
QRS DURATION: 90 MS
QT INTERVAL: 290 MS
QT INTERVAL: 340 MS
QT INTERVAL: 370 MS
QTC CALCULATION(BAZETT): 413 MS
QTC CALCULATION(BAZETT): 437 MS
QTC CALCULATION(BAZETT): 443 MS
QTC FREDERICIA: 367 MS
QTC FREDERICIA: 405 MS
QTC FREDERICIA: 413 MS
R AXIS: 53 DEGREES
R AXIS: 81 DEGREES
R AXIS: 96 DEGREES
T AXIS: 45 DEGREES
T AXIS: 55 DEGREES
T AXIS: 64 DEGREES
T OFFSET: 369 MS
T OFFSET: 392 MS
T OFFSET: 409 MS
VENTRICULAR RATE: 102 BPM
VENTRICULAR RATE: 122 BPM
VENTRICULAR RATE: 84 BPM

## 2024-01-15 NOTE — DISCHARGE INSTRUCTIONS
Return to the ER if you develop difficulty breathing, symptoms do not improve after 48 hours of antibiotics, chest pain with exertion, or if you develop any other new / concerning symptoms.

## 2024-01-15 NOTE — ED PROVIDER NOTES
HPI   Chief Complaint   Patient presents with    Chest Pain     Pt diagnosed with pneumonia 2 days ago. Pt states he is having constant chest pressure that radiates under right armpit. 7/10.       HPI     Pt presents for eval persistent Sx since being Dxed w/ PNA 2 days prior to eval. States still having persistent cough which causes R sided chest pain. Denies any exertional CP or dyspnea. Has since had 3 doses abx. Denies any LE edema or orthopnea. Denies past MHx               [place] Coma Scale Score: 15   HEART Score: 2                Patient History   No past medical history on file.  No past surgical history on file.  No family history on file.  Social History     Tobacco Use    Smoking status: Not on file    Smokeless tobacco: Not on file   Substance Use Topics    Alcohol use: Not on file    Drug use: Not on file       Physical Exam   ED Triage Vitals [01/14/24 1830]   Temp Heart Rate Resp BP   37.5 °C (99.5 °F) 107 22 121/78      SpO2 Temp Source Heart Rate Source Patient Position   98 % Tympanic -- Sitting      BP Location FiO2 (%)     Left arm --       Physical Exam  Vitals and nursing note reviewed.   Constitutional:       General: He is not in acute distress.     Appearance: He is well-developed.   HENT:      Head: Normocephalic and atraumatic.   Eyes:      Conjunctiva/sclera: Conjunctivae normal.   Cardiovascular:      Rate and Rhythm: Normal rate and regular rhythm.      Pulses:           Radial pulses are 2+ on the right side and 2+ on the left side.      Heart sounds: No murmur heard.  Pulmonary:      Effort: Pulmonary effort is normal. No accessory muscle usage or respiratory distress.      Breath sounds: Examination of the right-lower field reveals rales. Rales present.      Comments: Speaking in full sentences   Abdominal:      Palpations: Abdomen is soft.      Tenderness: There is no abdominal tenderness.   Musculoskeletal:         General: No swelling.      Cervical back: Neck supple.   Skin:      General: Skin is warm and dry.      Capillary Refill: Capillary refill takes less than 2 seconds.   Neurological:      Mental Status: He is alert.   Psychiatric:         Mood and Affect: Mood normal.         ED Course & MDM   ED Course as of 01/19/24 1259   Sun Jan 14, 2024   1840 EKG w/ sinus tachy, rate 102, no significant STTW changes. No stemi  [JH]      ED Course User Index  [JH] John Bland MD         Diagnoses as of 01/19/24 1259   Chest pain, unspecified type   Pneumonia due to infectious organism, unspecified laterality, unspecified part of lung       Medical Decision Making    Pt well on exam   VSS   Saturating 98% on RA   Lungs w/ R sided rales.   ON chart review pt underwent CT which showed c/f PNA. Currently on doxy. Will expand coverage w/ augmentin. Provided anti tussive in ED w/ improvement in Sx. Cardiac BURTON reassuring, lower suspicion ACS as Sx non exertional and largely related to persistent cough. Reports improvement in Sx after NSAIDs and anti tussive. Pt Dced home. Plan for close outpt FU. RTER precautions dw Pt. Pt verbalized understanding w/ plan.  Procedure  Procedures     Jhon Bland MD  01/19/24 1310       John Bland MD  01/19/24 1325

## 2024-01-17 LAB
BACTERIA BLD CULT: NORMAL
BACTERIA BLD CULT: NORMAL

## 2025-06-19 ENCOUNTER — APPOINTMENT (OUTPATIENT)
Dept: RADIOLOGY | Facility: HOSPITAL | Age: 45
End: 2025-06-19
Payer: COMMERCIAL

## 2025-06-19 ENCOUNTER — APPOINTMENT (OUTPATIENT)
Dept: CARDIOLOGY | Facility: HOSPITAL | Age: 45
End: 2025-06-19
Payer: COMMERCIAL

## 2025-06-19 ENCOUNTER — OFFICE VISIT (OUTPATIENT)
Dept: URGENT CARE | Age: 45
End: 2025-06-19
Payer: COMMERCIAL

## 2025-06-19 ENCOUNTER — HOSPITAL ENCOUNTER (EMERGENCY)
Facility: HOSPITAL | Age: 45
Discharge: HOME | End: 2025-06-19
Attending: EMERGENCY MEDICINE
Payer: COMMERCIAL

## 2025-06-19 VITALS
OXYGEN SATURATION: 98 % | SYSTOLIC BLOOD PRESSURE: 136 MMHG | RESPIRATION RATE: 20 BRPM | DIASTOLIC BLOOD PRESSURE: 90 MMHG | HEART RATE: 67 BPM | TEMPERATURE: 97.8 F

## 2025-06-19 VITALS
OXYGEN SATURATION: 99 % | RESPIRATION RATE: 18 BRPM | WEIGHT: 202 LBS | BODY MASS INDEX: 28.92 KG/M2 | SYSTOLIC BLOOD PRESSURE: 140 MMHG | TEMPERATURE: 97.9 F | HEART RATE: 77 BPM | HEIGHT: 70 IN | DIASTOLIC BLOOD PRESSURE: 103 MMHG

## 2025-06-19 DIAGNOSIS — K65.4 MESENTERIC PANNICULITIS (MULTI): ICD-10-CM

## 2025-06-19 DIAGNOSIS — I10 DIASTOLIC HYPERTENSION: ICD-10-CM

## 2025-06-19 DIAGNOSIS — R53.81 MALAISE AND FATIGUE: ICD-10-CM

## 2025-06-19 DIAGNOSIS — R07.9 CHEST PAIN, UNSPECIFIED TYPE: Primary | ICD-10-CM

## 2025-06-19 DIAGNOSIS — R11.10 VOMITING AND DIARRHEA: ICD-10-CM

## 2025-06-19 DIAGNOSIS — R53.83 MALAISE AND FATIGUE: ICD-10-CM

## 2025-06-19 DIAGNOSIS — R19.7 VOMITING AND DIARRHEA: ICD-10-CM

## 2025-06-19 LAB
ALBUMIN SERPL BCP-MCNC: 4.3 G/DL (ref 3.4–5)
ALP SERPL-CCNC: 75 U/L (ref 33–120)
ALT SERPL W P-5'-P-CCNC: 29 U/L (ref 10–52)
ANION GAP SERPL CALCULATED.3IONS-SCNC: 10 MMOL/L (ref 10–20)
APPEARANCE UR: CLEAR
AST SERPL W P-5'-P-CCNC: 19 U/L (ref 9–39)
ATRIAL RATE: 67 BPM
BASOPHILS # BLD AUTO: 0.06 X10*3/UL (ref 0–0.1)
BASOPHILS NFR BLD AUTO: 0.6 %
BILIRUB SERPL-MCNC: 0.6 MG/DL (ref 0–1.2)
BILIRUB UR STRIP.AUTO-MCNC: NEGATIVE MG/DL
BNP SERPL-MCNC: 19 PG/ML (ref 0–99)
BUN SERPL-MCNC: 18 MG/DL (ref 6–23)
CALCIUM SERPL-MCNC: 8.7 MG/DL (ref 8.6–10.3)
CARDIAC TROPONIN I PNL SERPL HS: 3 NG/L (ref 0–20)
CARDIAC TROPONIN I PNL SERPL HS: <3 NG/L (ref 0–20)
CHLORIDE SERPL-SCNC: 102 MMOL/L (ref 98–107)
CO2 SERPL-SCNC: 26 MMOL/L (ref 21–32)
COLOR UR: COLORLESS
CREAT SERPL-MCNC: 0.8 MG/DL (ref 0.5–1.3)
EGFRCR SERPLBLD CKD-EPI 2021: >90 ML/MIN/1.73M*2
EOSINOPHIL # BLD AUTO: 0.05 X10*3/UL (ref 0–0.7)
EOSINOPHIL NFR BLD AUTO: 0.5 %
ERYTHROCYTE [DISTWIDTH] IN BLOOD BY AUTOMATED COUNT: 12.3 % (ref 11.5–14.5)
GLUCOSE SERPL-MCNC: 98 MG/DL (ref 74–99)
GLUCOSE UR STRIP.AUTO-MCNC: NORMAL MG/DL
HCT VFR BLD AUTO: 45.2 % (ref 41–52)
HGB BLD-MCNC: 15.8 G/DL (ref 13.5–17.5)
IMM GRANULOCYTES # BLD AUTO: 0.03 X10*3/UL (ref 0–0.7)
IMM GRANULOCYTES NFR BLD AUTO: 0.3 % (ref 0–0.9)
KETONES UR STRIP.AUTO-MCNC: NEGATIVE MG/DL
LEUKOCYTE ESTERASE UR QL STRIP.AUTO: NEGATIVE
LIPASE SERPL-CCNC: 14 U/L (ref 9–82)
LYMPHOCYTES # BLD AUTO: 1.41 X10*3/UL (ref 1.2–4.8)
LYMPHOCYTES NFR BLD AUTO: 14.3 %
MCH RBC QN AUTO: 32 PG (ref 26–34)
MCHC RBC AUTO-ENTMCNC: 35 G/DL (ref 32–36)
MCV RBC AUTO: 92 FL (ref 80–100)
MONOCYTES # BLD AUTO: 0.47 X10*3/UL (ref 0.1–1)
MONOCYTES NFR BLD AUTO: 4.8 %
NEUTROPHILS # BLD AUTO: 7.87 X10*3/UL (ref 1.2–7.7)
NEUTROPHILS NFR BLD AUTO: 79.5 %
NITRITE UR QL STRIP.AUTO: NEGATIVE
NRBC BLD-RTO: 0 /100 WBCS (ref 0–0)
P AXIS: 77 DEGREES
P OFFSET: 201 MS
P ONSET: 152 MS
PH UR STRIP.AUTO: 5.5 [PH]
PLATELET # BLD AUTO: 240 X10*3/UL (ref 150–450)
POTASSIUM SERPL-SCNC: 3.9 MMOL/L (ref 3.5–5.3)
PR INTERVAL: 146 MS
PROT SERPL-MCNC: 6.7 G/DL (ref 6.4–8.2)
PROT UR STRIP.AUTO-MCNC: NEGATIVE MG/DL
Q ONSET: 225 MS
QRS COUNT: 11 BEATS
QRS DURATION: 86 MS
QT INTERVAL: 384 MS
QTC CALCULATION(BAZETT): 405 MS
QTC FREDERICIA: 398 MS
R AXIS: 55 DEGREES
RBC # BLD AUTO: 4.94 X10*6/UL (ref 4.5–5.9)
RBC # UR STRIP.AUTO: NEGATIVE MG/DL
SODIUM SERPL-SCNC: 134 MMOL/L (ref 136–145)
SP GR UR STRIP.AUTO: 1.01
T AXIS: 63 DEGREES
T OFFSET: 417 MS
UROBILINOGEN UR STRIP.AUTO-MCNC: NORMAL MG/DL
VENTRICULAR RATE: 67 BPM
WBC # BLD AUTO: 9.9 X10*3/UL (ref 4.4–11.3)

## 2025-06-19 PROCEDURE — 71275 CT ANGIOGRAPHY CHEST: CPT

## 2025-06-19 PROCEDURE — 80053 COMPREHEN METABOLIC PANEL: CPT | Performed by: EMERGENCY MEDICINE

## 2025-06-19 PROCEDURE — 96361 HYDRATE IV INFUSION ADD-ON: CPT

## 2025-06-19 PROCEDURE — 84484 ASSAY OF TROPONIN QUANT: CPT | Performed by: EMERGENCY MEDICINE

## 2025-06-19 PROCEDURE — 2500000004 HC RX 250 GENERAL PHARMACY W/ HCPCS (ALT 636 FOR OP/ED): Performed by: EMERGENCY MEDICINE

## 2025-06-19 PROCEDURE — 74177 CT ABD & PELVIS W/CONTRAST: CPT | Performed by: STUDENT IN AN ORGANIZED HEALTH CARE EDUCATION/TRAINING PROGRAM

## 2025-06-19 PROCEDURE — 85025 COMPLETE CBC W/AUTO DIFF WBC: CPT | Performed by: EMERGENCY MEDICINE

## 2025-06-19 PROCEDURE — 36415 COLL VENOUS BLD VENIPUNCTURE: CPT | Performed by: EMERGENCY MEDICINE

## 2025-06-19 PROCEDURE — 93005 ELECTROCARDIOGRAM TRACING: CPT

## 2025-06-19 PROCEDURE — 71275 CT ANGIOGRAPHY CHEST: CPT | Performed by: STUDENT IN AN ORGANIZED HEALTH CARE EDUCATION/TRAINING PROGRAM

## 2025-06-19 PROCEDURE — 96375 TX/PRO/DX INJ NEW DRUG ADDON: CPT | Mod: 59

## 2025-06-19 PROCEDURE — 99285 EMERGENCY DEPT VISIT HI MDM: CPT | Mod: 25 | Performed by: EMERGENCY MEDICINE

## 2025-06-19 PROCEDURE — 83880 ASSAY OF NATRIURETIC PEPTIDE: CPT | Performed by: EMERGENCY MEDICINE

## 2025-06-19 PROCEDURE — 74177 CT ABD & PELVIS W/CONTRAST: CPT

## 2025-06-19 PROCEDURE — 83690 ASSAY OF LIPASE: CPT | Performed by: EMERGENCY MEDICINE

## 2025-06-19 PROCEDURE — 96374 THER/PROPH/DIAG INJ IV PUSH: CPT | Mod: 59

## 2025-06-19 PROCEDURE — 2550000001 HC RX 255 CONTRASTS: Performed by: EMERGENCY MEDICINE

## 2025-06-19 PROCEDURE — 81003 URINALYSIS AUTO W/O SCOPE: CPT | Performed by: EMERGENCY MEDICINE

## 2025-06-19 RX ORDER — FAMOTIDINE 20 MG/1
20 TABLET, FILM COATED ORAL 2 TIMES DAILY
Qty: 20 TABLET | Refills: 0 | Status: SHIPPED | OUTPATIENT
Start: 2025-06-19 | End: 2025-07-03

## 2025-06-19 RX ORDER — KETOROLAC TROMETHAMINE 15 MG/ML
15 INJECTION, SOLUTION INTRAMUSCULAR; INTRAVENOUS ONCE
Status: COMPLETED | OUTPATIENT
Start: 2025-06-19 | End: 2025-06-19

## 2025-06-19 RX ORDER — ONDANSETRON 4 MG/1
4 TABLET, ORALLY DISINTEGRATING ORAL EVERY 8 HOURS PRN
Qty: 10 TABLET | Refills: 0 | Status: SHIPPED | OUTPATIENT
Start: 2025-06-19 | End: 2025-06-22

## 2025-06-19 RX ORDER — IBUPROFEN 600 MG/1
600 TABLET, FILM COATED ORAL EVERY 6 HOURS PRN
Qty: 20 TABLET | Refills: 0 | Status: SHIPPED | OUTPATIENT
Start: 2025-06-19 | End: 2025-06-24

## 2025-06-19 RX ORDER — ONDANSETRON HYDROCHLORIDE 2 MG/ML
4 INJECTION, SOLUTION INTRAVENOUS ONCE
Status: COMPLETED | OUTPATIENT
Start: 2025-06-19 | End: 2025-06-19

## 2025-06-19 RX ORDER — FAMOTIDINE 10 MG/ML
20 INJECTION, SOLUTION INTRAVENOUS ONCE
Status: COMPLETED | OUTPATIENT
Start: 2025-06-19 | End: 2025-06-19

## 2025-06-19 RX ADMIN — IOHEXOL 75 ML: 350 INJECTION, SOLUTION INTRAVENOUS at 11:37

## 2025-06-19 RX ADMIN — IOHEXOL 50 ML: 350 INJECTION, SOLUTION INTRAVENOUS at 12:55

## 2025-06-19 RX ADMIN — SODIUM CHLORIDE 1000 ML: 900 INJECTION, SOLUTION INTRAVENOUS at 11:10

## 2025-06-19 RX ADMIN — KETOROLAC TROMETHAMINE 15 MG: 15 INJECTION, SOLUTION INTRAMUSCULAR; INTRAVENOUS at 11:10

## 2025-06-19 RX ADMIN — FAMOTIDINE 20 MG: 10 INJECTION, SOLUTION INTRAVENOUS at 11:10

## 2025-06-19 RX ADMIN — ONDANSETRON 4 MG: 2 INJECTION, SOLUTION INTRAMUSCULAR; INTRAVENOUS at 11:10

## 2025-06-19 ASSESSMENT — ENCOUNTER SYMPTOMS
VOMITING: 1
NAUSEA: 1
DIARRHEA: 1

## 2025-06-19 ASSESSMENT — PATIENT HEALTH QUESTIONNAIRE - PHQ9
SUM OF ALL RESPONSES TO PHQ9 QUESTIONS 1 AND 2: 0
1. LITTLE INTEREST OR PLEASURE IN DOING THINGS: NOT AT ALL
2. FEELING DOWN, DEPRESSED OR HOPELESS: NOT AT ALL

## 2025-06-19 NOTE — PROGRESS NOTES
Subjective   Patient ID: Cristobal Singh is a 44 y.o. male. They present today with a chief complaint of Chest Pain (And vomiting. 2 days).    History of Present Illness  Cristobal Singh is a 44 y.o. male who presents to the clinic for 2 days of left sided chest pain.  Patient states the pain sometimes radiates to the back.  Patient states he has had associated nausea vomiting and diarrhea over the past 2 days.  Chest pain is nonreproducible.  Pt denies any chest pain, sob, N/V at this time in clinic.             Past Medical History  Allergies as of 06/19/2025    (No Known Allergies)       Prescriptions Prior to Admission[1]     Medical History[2]    Surgical History[3]     reports that he has been smoking cigarettes. He has never used smokeless tobacco.    Review of Systems  Review of Systems   Cardiovascular:  Positive for chest pain.   Gastrointestinal:  Positive for diarrhea, nausea and vomiting.   All other systems reviewed and are negative.                                 Objective    Vitals:    06/19/25 1003   BP: 136/90   Pulse: 67   Resp: 20   Temp: 36.6 °C (97.8 °F)   SpO2: 98%     No LMP for male patient.    Physical Exam  Constitutional:       Appearance: Normal appearance.   HENT:      Right Ear: Tympanic membrane normal.      Left Ear: Tympanic membrane normal.   Cardiovascular:      Rate and Rhythm: Normal rate and regular rhythm.   Pulmonary:      Effort: Pulmonary effort is normal.      Breath sounds: Normal breath sounds.   Neurological:      General: No focal deficit present.      Mental Status: He is alert and oriented to person, place, and time. Mental status is at baseline.   Psychiatric:         Mood and Affect: Mood normal.         Behavior: Behavior normal.         Procedures    Point of Care Test & Imaging Results from this visit  No results found for this visit on 06/19/25.   Imaging  No results found.    Cardiology, Vascular, and Other Imaging  No other imaging results found for  the past 2 days      Diagnostic study results (if any) were reviewed by CHAI Frey.    Assessment/Plan   Allergies, medications, history, and pertinent labs/EKGs/Imaging reviewed by CHAI Frey.     Medical Decision Making  History and physical exam of suggestive of possible cardiac abnormality. Case and EKG reviewed with supervising physician. Patient verbalized understanding of emergent need for evaluation. Patient is transferred to ER for further evaluation and care. Case and EKG reviewed with supervising doc, Dr. Crotez.   Advised patient I would call EMS to transport patient to ER.  Patient declined.  Patient signed AMA form.  Please see signed form.  Patient has capacity make informed medical decisions.      Orders and Diagnoses  Diagnoses and all orders for this visit:  Chest pain, unspecified type      Medical Admin Record      Patient disposition: ED    Electronically signed by CHAI Frey  10:28 AM           [1] (Not in a hospital admission)   [2] No past medical history on file.  [3] No past surgical history on file.

## 2025-06-19 NOTE — ED TRIAGE NOTES
Nausea vomiting and Diarrhea started Wednesday. Pt now complaining of chest pain when coughing and having back pain between the shoulders

## 2025-06-19 NOTE — PROGRESS NOTES
Spiritual Care Visit  Spiritual Care Request    Reason for Visit:  Routine Visit: Introduction  Crisis Visit: ED     Request Received From:       Focus of Care:  Visited With: Patient         Refer to :          Spiritual Care Assessment    Spiritual Assessment:                      Care Provided:       Sense of Community and or Yazdanism Affiliation:  Yazdanism   Values/Beliefs  Spiritual Requests During Hospitalization: Reginaldo could not be seen today.     Addressed Needs/Concerns and/or Scarlett Through:          Outcome:        Advance Directives:         Spiritual Care Annotation    Annotation:  Reginaldo could not be seen today.  Michael Marie

## 2025-06-19 NOTE — ED PROVIDER NOTES
Emergency Department Provider Note       History of Present Illness     History provided by: Patient  Limitations to History: None  External Records Reviewed with Brief Summary: None        Physical Exam   Triage vitals:  T 36.6 °C (97.9 °F)  HR 77  BP (!) 140/103  RR 18  O2 99 % None (Room air)          Medical Decision Making & ED Course     The patient is a 44-year-old male presenting to the emergency department for evaluation of substernal and left-sided chest pain, nausea, vomiting, diarrhea, generalized malaise and fatigue and some shortness of breath.  The patient states that he has had worsening symptoms over the past 2 days.  He denies any sick contacts or recent travel.  He states he does smoke daily but he only occasionally drinks alcohol.  He has not had any recent alcohol intake.  He denies any chronic medical conditions.  He denies any history of CAD or ACS.  No history of PE or DVT.  No history of hypertension, hyperlipidemia or diabetes.  No sick contacts or recent travel.  No headache or visual changes.  No palpitations.  No diaphoresis.  No fever or chills.  No neck or back pain.  No abdominal pain.  No urinary complaints.  All pertinent positives and negatives are recorded above.  All other systems reviewed and otherwise negative.  Vital signs with diastolic hypertension but otherwise within normal limits.  Physical exam with a well-nourished well-developed male in no acute distress.  HEENT exam within normal limits.  He has no evidence of airway compromise or respiratory distress on exam.  He does not have any gross motor, neurologic or vascular deficits on exam.  Pulses were equal bilaterally.  Abdominal exam is benign.      EKG with normal sinus rhythm at 67 bpm, normal axis, normal voltage, normal ST segment, normal T waves      Repeat EKG with normal sinus rhythm at 61 bpm, normal axis, normal voltage, normal ST segment, normal T waves      IV Pepcid, IV Toradol, IV Zofran and IV fluids  ordered.      Diagnostic labs with a mild electrolyte imbalance but otherwise unremarkable.      Initial troponin 3.  Repeat troponin < 3      Heart score of 1      CT angio chest for pulmonary embolism   Final Result   1. No evidence of acute pulmonary embolism.   2. Moderate paraseptal emphysema.        MACRO:   None        Signed by: Macho Reinoso 6/19/2025 2:25 PM   Dictation workstation:   [id number]      CT abdomen pelvis w IV contrast   Final Result   1.  No acute findings in the abdomen.   2. Findings compatible with mesenteric panniculitis.             Signed by: Dany Reed 6/19/2025 12:41 PM   Dictation workstation:   [id number]           The patient does not have any evidence of airway compromise or respiratory distress on exam.  He does not have any gross motor, neurologic or vascular deficits on exam.  He is well-perfused on exam.  No evidence of sepsis.  No evidence of a STEMI on EKG or cardiac enzymes.  No events on telemetry.  CT chest abdomen pelvis shows no evidence of PE or dissection.  No evidence of pneumonia or pneumothorax.  No evidence of CHF.  No widening of the mediastinum.  No acute process within the abdomen or pelvis.  No evidence of pancreatitis, cholecystitis, diverticulitis or appendicitis.  No evidence of bowel obstruction.  There are some findings compatible with mesenteric panniculitis per the radiologist reading but this does not correlate to the patient's exam.  He does not have a surgical abdomen on exam.      The patient was released in good condition.  He was given a prescription for Pepcid, ibuprofen and Zofran.  He was instructed to follow-up with his primary care physician within 1 to 2 days for further management of his current symptoms and repeat check of his blood pressure.  He was also given a referral to cardiology for further management of his chest pain and to gastroenterology for further management of the mesenteric panniculitis reported by the  radiologist.  He will return to the emergency department sooner with worsening of symptoms or onset of new symptoms        Impression/diagnosis  Chest pain, substernal and left-sided  Nausea, vomiting and diarrhea  Malaise fatigue  Diastolic hypertension  Mesenteric panniculitis      I independently interpreted the results of the EKG and diagnostic labs      I reviewed the results of the diagnostic labs and diagnostic imaging.  Formal radiology reading was completed by the radiologist      Disposition   As a result of the work-up, the patient was discharged home.  he was informed of his diagnosis and instructed to come back with any concerns or worsening of condition.  he and was agreeable to the plan as discussed above.  he was given the opportunity to ask questions.  All of the patient's questions were answered.    Procedures   Procedures        Mildred Francis MD  Emergency Medicine                                                       Mildred Francis MD  06/19/25 9249

## 2025-06-19 NOTE — Clinical Note
Cristobal Samantha was seen and treated in our emergency department on 6/19/2025.  He may return to work on 06/21/2025.       If you have any questions or concerns, please don't hesitate to call.      Mildred Francis MD

## 2025-06-23 LAB
ATRIAL RATE: 67 BPM
P AXIS: 77 DEGREES
P OFFSET: 201 MS
P ONSET: 152 MS
PR INTERVAL: 146 MS
Q ONSET: 225 MS
QRS COUNT: 11 BEATS
QRS DURATION: 86 MS
QT INTERVAL: 384 MS
QTC CALCULATION(BAZETT): 405 MS
QTC FREDERICIA: 398 MS
R AXIS: 55 DEGREES
T AXIS: 63 DEGREES
T OFFSET: 417 MS
VENTRICULAR RATE: 67 BPM

## 2025-07-03 ENCOUNTER — OFFICE VISIT (OUTPATIENT)
Dept: PRIMARY CARE | Facility: CLINIC | Age: 45
End: 2025-07-03
Payer: COMMERCIAL

## 2025-07-03 VITALS
OXYGEN SATURATION: 98 % | HEART RATE: 76 BPM | BODY MASS INDEX: 28.32 KG/M2 | TEMPERATURE: 98.5 F | WEIGHT: 197.4 LBS | DIASTOLIC BLOOD PRESSURE: 80 MMHG | SYSTOLIC BLOOD PRESSURE: 126 MMHG

## 2025-07-03 DIAGNOSIS — J84.10 LUNG GRANULOMA (MULTI): ICD-10-CM

## 2025-07-03 DIAGNOSIS — K65.4 MESENTERIC PANNICULITIS (MULTI): ICD-10-CM

## 2025-07-03 DIAGNOSIS — J43.9 PULMONARY EMPHYSEMA, UNSPECIFIED EMPHYSEMA TYPE (MULTI): Primary | ICD-10-CM

## 2025-07-03 DIAGNOSIS — K29.00 ACUTE GASTRITIS WITHOUT HEMORRHAGE, UNSPECIFIED GASTRITIS TYPE: ICD-10-CM

## 2025-07-03 PROCEDURE — 99204 OFFICE O/P NEW MOD 45 MIN: CPT | Performed by: PHYSICIAN ASSISTANT

## 2025-07-03 PROCEDURE — 4004F PT TOBACCO SCREEN RCVD TLK: CPT | Performed by: PHYSICIAN ASSISTANT

## 2025-07-03 RX ORDER — PANTOPRAZOLE SODIUM 40 MG/1
40 TABLET, DELAYED RELEASE ORAL DAILY
Qty: 30 TABLET | Refills: 1 | Status: SHIPPED | OUTPATIENT
Start: 2025-07-03 | End: 2025-09-01

## 2025-07-03 RX ORDER — IBUPROFEN 600 MG/1
600 TABLET, FILM COATED ORAL EVERY 6 HOURS PRN
COMMUNITY

## 2025-07-03 ASSESSMENT — ENCOUNTER SYMPTOMS
UNEXPECTED WEIGHT CHANGE: 0
VOMITING: 0
HEMATOLOGIC/LYMPHATIC NEGATIVE: 1
APPETITE CHANGE: 1
ACTIVITY CHANGE: 0
PSYCHIATRIC NEGATIVE: 1
PALPITATIONS: 0
ANAL BLEEDING: 0
ENDOCRINE NEGATIVE: 1
BLOOD IN STOOL: 0
CONSTIPATION: 0
FEVER: 0
MUSCULOSKELETAL NEGATIVE: 1
DIAPHORESIS: 0
ALLERGIC/IMMUNOLOGIC NEGATIVE: 1
NEUROLOGICAL NEGATIVE: 1
CHILLS: 0
ABDOMINAL DISTENTION: 0
RECTAL PAIN: 0
DIARRHEA: 0
FATIGUE: 0
NAUSEA: 1
RESPIRATORY NEGATIVE: 1
ABDOMINAL PAIN: 1
EYES NEGATIVE: 1

## 2025-07-03 ASSESSMENT — PAIN SCALES - GENERAL: PAINLEVEL_OUTOF10: 0-NO PAIN

## 2025-07-03 ASSESSMENT — PATIENT HEALTH QUESTIONNAIRE - PHQ9
1. LITTLE INTEREST OR PLEASURE IN DOING THINGS: NOT AT ALL
2. FEELING DOWN, DEPRESSED OR HOPELESS: NOT AT ALL
SUM OF ALL RESPONSES TO PHQ9 QUESTIONS 1 AND 2: 0

## 2025-07-03 NOTE — PROGRESS NOTES
Subjective     Patient ID: Cristobal Singh is a 44 y.o. male who presents for Establish Care and Follow-up.    HPI  Cristobal Singh is new to me today.  He wishes to establish care.  He is recently seen in the emergency department for chest pain and abdominal pain.  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  He was discharged with referral to GI and cardiology.  He was started on Pepcid, Zofran as needed.  Reports symptoms have improved however noticed that has worsened when he drink a Monster.  The symptoms are not exertional in nature.  He does admit to excessive belching.  He does mention nausea.  He is a current smoker half pack per day, he is in efforts of quitting smoking.  He denies any shortness of breath or dyspnea exertion.  Denies any palpitations.  No known history of pancreatitis.  He has an appointment with a GI, he has an appointment with cardiology.  He has no appointment with pulmonology at this point, referral be placed.    We discussed I am suspicious for gastritis/GERD.  Will start him on Protonix, stop Pepcid.  He is already scheduled for a colonoscopy and upper endoscopy with GI.  Denies any blood in the stool.  Denies any infectious symptoms.  Vitals are stable.  He is afebrile.  I will arrange a follow-up physical with him in 3 months.    Review of Systems   Constitutional:  Positive for appetite change. Negative for activity change, chills, diaphoresis, fatigue, fever and unexpected weight change.   HENT: Negative.     Eyes: Negative.    Respiratory: Negative.     Cardiovascular:  Negative for chest pain, palpitations and leg swelling.   Gastrointestinal:  Positive for abdominal pain (epigastric) and nausea. Negative for abdominal distention, anal bleeding, blood in stool, constipation, diarrhea, rectal pain and vomiting.   Endocrine: Negative.    Genitourinary: Negative.    Musculoskeletal: Negative.    Skin: Negative.     Allergic/Immunologic: Negative.    Neurological: Negative.    Hematological: Negative.    Psychiatric/Behavioral: Negative.         Objective  Vitals:  /80   Pulse 76   Temp 36.9 °C (98.5 °F)   Wt 89.5 kg (197 lb 6.4 oz)   SpO2 98%   BMI 28.32 kg/m²     Physical Exam  Vitals and nursing note reviewed.   Constitutional:       General: He is not in acute distress.     Appearance: Normal appearance. He is normal weight. He is not ill-appearing, toxic-appearing or diaphoretic.   HENT:      Head: Normocephalic and atraumatic.      Right Ear: Tympanic membrane, ear canal and external ear normal. There is no impacted cerumen.      Left Ear: Tympanic membrane and ear canal normal. There is no impacted cerumen.      Nose: Nose normal. No congestion or rhinorrhea.      Mouth/Throat:      Mouth: Mucous membranes are moist.      Pharynx: No oropharyngeal exudate or posterior oropharyngeal erythema.   Eyes:      General: No scleral icterus.        Right eye: No discharge.         Left eye: No discharge.      Extraocular Movements: Extraocular movements intact.      Conjunctiva/sclera: Conjunctivae normal.      Pupils: Pupils are equal, round, and reactive to light.   Neck:      Vascular: No carotid bruit.   Cardiovascular:      Rate and Rhythm: Normal rate and regular rhythm.      Pulses: Normal pulses.      Heart sounds: Normal heart sounds. No murmur heard.  Pulmonary:      Effort: Pulmonary effort is normal. No respiratory distress.      Breath sounds: No stridor. No wheezing, rhonchi or rales.   Chest:      Chest wall: No tenderness.   Abdominal:      General: Bowel sounds are normal. There is no distension.      Palpations: Abdomen is soft. There is no mass.      Tenderness: There is abdominal tenderness (epigastric). There is no right CVA tenderness, left CVA tenderness, guarding or rebound.      Hernia: No hernia is present.   Musculoskeletal:         General: No swelling, tenderness, deformity or signs of  injury. Normal range of motion.      Cervical back: Normal range of motion. No rigidity or tenderness.      Right lower leg: No edema.      Left lower leg: No edema.   Lymphadenopathy:      Cervical: No cervical adenopathy.   Skin:     General: Skin is warm.      Capillary Refill: Capillary refill takes less than 2 seconds.      Coloration: Skin is not jaundiced or pale.      Findings: No bruising, erythema, lesion or rash.   Neurological:      General: No focal deficit present.      Mental Status: He is alert and oriented to person, place, and time.      Cranial Nerves: No cranial nerve deficit.      Sensory: No sensory deficit.      Motor: No weakness.      Coordination: Coordination normal.      Gait: Gait normal.      Deep Tendon Reflexes: Reflexes normal.   Psychiatric:         Mood and Affect: Mood normal.         Behavior: Behavior normal.         Thought Content: Thought content normal.         Judgment: Judgment normal.         No diagnosis found.     CBC:   Lab Results   Component Value Date    WBC 9.9 06/19/2025    RBC 4.94 06/19/2025    HGB 15.8 06/19/2025    HCT 45.2 06/19/2025    MCV 92 06/19/2025    MCH 32.0 06/19/2025    MCHC 35.0 06/19/2025    RDWS 44.1 09/18/2020    RDWC 12.3 09/18/2020     06/19/2025    MPV 10.1 09/18/2020       CBC w/Diff:   Lab Results   Component Value Date    WBC 9.9 06/19/2025    NRBC 0.0 06/19/2025    RBC 4.94 06/19/2025    HGB 15.8 06/19/2025    HCT 45.2 06/19/2025    MCV 92 06/19/2025    MCHC 35.0 06/19/2025     06/19/2025    RDW 12.3 06/19/2025    NEUTOPHILPCT 79.5 06/19/2025    IGPCT 0.3 06/19/2025    LYMPHOPCT 14.3 06/19/2025    MONOPCT 4.8 06/19/2025    EOSPCT 0.5 06/19/2025    BASOPCT 0.6 06/19/2025    NEUTROABS 7.87 (H) 06/19/2025    LYMPHSABS 1.41 06/19/2025    MONOSABS 0.47 06/19/2025    EOSABS 0.05 06/19/2025    BASOSABS 0.06 06/19/2025        CMP:  Lab Results   Component Value Date    GLUCOSE 98 06/19/2025     (L) 06/19/2025    K 3.9  "06/19/2025     06/19/2025    CO2 26 06/19/2025    ANIONGAP 10 06/19/2025    BUN 18 06/19/2025    CREATININE 0.80 06/19/2025    CALCIUM 8.7 06/19/2025    ALBUMIN 4.3 06/19/2025    ALKPHOS 75 06/19/2025    PROT 6.7 06/19/2025    AST 19 06/19/2025    BILITOT 0.6 06/19/2025    ALT 29 06/19/2025        BMP:  Lab Results   Component Value Date    GLUCOSE 98 06/19/2025    BUN 18 06/19/2025    CREATININE 0.80 06/19/2025    UREACREAUR 11.0 08/17/2019     (L) 06/19/2025    K 3.9 06/19/2025     06/19/2025    CO2 26 06/19/2025    ANIONGAP 10 06/19/2025    CALCIUM 8.7 06/19/2025    EGFR >90 06/19/2025        Lipid:   No results found for: \"CHOL\", \"HDL\", \"CHHDL\", \"LDLCALC\", \"VLDL\", \"TRIG\"    LDL Direct:  No results found for: \"LDLDIRECT\"     TSH:   No results found for: \"TSH\"     B12:  No results found for: \"XMOQWRRE55\"    Vitamin D:  No results found for: \"VITD25\"     HgA1c:   No results found for: \"HGBA1C\", \"JVVHMFIY3H\"    PSA:   No results found for: \"PSA\"    FreeT4:  No results found for: \"FREET4\"    T3:   No results found for: \"T3FREE\"    CT Abdomen/pelvis w/ IV contrast  Interpreted By:  Dany Reed,   STUDY:  CT ABDOMEN PELVIS W IV CONTRAST;  6/19/2025 11:51 am      INDICATION:  Signs/Symptoms:Chest pain, abdominal pain, nausea, vomiting.      COMPARISON:  None.      ACCESSION NUMBER(S):  [id number]      ORDERING CLINICIAN:  ABRAM BOYER      TECHNIQUE:  CT of the abdomen and pelvis was performed.  Standard contiguous  axial images were obtained at 3 mm slice thickness through the  abdomen and pelvis. Coronal and sagittal reconstructions at 3 mm  slice thickness were performed.      75 ml of contrast Omnipaque 350 were administered intravenously  without immediate complication.      FINDINGS:  LOWER CHEST:  Right lower lobe calcified granuloma. Mild right basilar atelectasis.  Minimal honeycombing in the bilateral lung bases.      ABDOMEN:      LIVER:  The liver demonstrates homogeneous attenuation " without evidence of  focal liver lesions.      BILE DUCTS:  The intrahepatic and extrahepatic ducts are not dilated.      GALLBLADDER:  No calcified stones. No wall thickening.      PANCREAS:  The pancreas appears unremarkable without evidence of ductal  dilatation or masses.      SPLEEN:  The spleen is normal in size without focal lesions.      ADRENAL GLANDS:  Bilateral adrenal glands appear normal.      KIDNEYS AND URETERS:  The kidneys are normal in size and enhance symmetrically.  No  hydroureteronephrosis or nephroureterolithiasis is identified.      PELVIS:      BLADDER:  The urinary bladder appears normal without abnormal wall thickening.      REPRODUCTIVE ORGANS:  No pelvic masses.      BOWEL:  The stomach is unremarkable.   The small and large bowel are normal  in caliber and demonstrate no wall thickening.   Normal appendix.      VESSELS:  There is no aneurysmal dilatation of the abdominal aorta. The IVC  appears normal.      PERITONEUM/RETROPERITONEUM/LYMPH NODES:  Mild mid abdominal mesenteric soft tissue stranding multiple  prominent mildly enlarged lymph nodes, likely secondary to mesenteric  panniculitis. No abdominopelvic lymphadenopathy is present.      BONES AND ABDOMINAL WALL:  No suspicious osseous lesions are identified.  The abdominal wall  soft tissues appear normal.      IMPRESSION:  1.  No acute findings in the abdomen.  2. Findings compatible with mesenteric panniculitis.        CT Chest w/ IV contrast PE protocol  Narrative & Impression   Interpreted By:  Macho Reinoso,   STUDY:  CT ANGIO CHEST FOR PULMONARY EMBOLISM;  6/19/2025 1:03 pm      INDICATION:  Signs/Symptoms:Chest pain, dyspnea.          COMPARISON:  01/13/2024      ACCESSION NUMBER(S):  [id number]      ORDERING CLINICIAN:  ABRAM BOYER      TECHNIQUE:  Helical data acquisition of the chest was obtained after intravenous  administration of 50 ML Omnipaque 350, as per PE protocol. Images  were reformatted in coronal and  sagittal planes. Axial and coronal  maximum intensity projection (MIP) images were created and reviewed.      FINDINGS:      LOWER NECK, MEDIASTINUM /LESLY, AND AXILLA:  Visualized thyroid gland is within normal limits.      No thoracic lymphadenopathy by CT criteria.      Esophagus within normal limits.      HEART AND VESSELS:  Normal cardiac size. No evidence of pericardial effusion.      Mild coronary artery calcifications. The study is not optimized for  evaluation of coronary arteries.      No discrete filling defects within main pulmonary artery and its  branches to suggest acute pulmonary embolism.      Main pulmonary artery and its branches are unremarkable in caliber.      No thoracic aortic aneurysm.      LUNGS AND AIRWAYS:  Trachea and central airways are patent. No endobronchial lesion.      Negative for pneumonia, pulmonary edema or mass. No pneumothorax or  pleural effusion. Calcified granuloma basilar right lower lobe.  Moderate paraseptal emphysema. Calcifications along the right  hemidiaphragm are unchanged.      UPPER ABDOMEN:  Visualized subdiaphragmatic structures demonstrate no acute  abnormality. Punctate splenic parenchymal calcification suggests  sequela of prior granulomatous infection.      CHEST WALL AND OSSEOUS STRUCTURES:  No acute osseous abnormality.Multilevel degenerative changes of the  imaged spine.      IMPRESSION:  1. No evidence of acute pulmonary embolism.  2. Moderate paraseptal emphysema.      MACRO:  None      Signed by: Macho Reinoso 6/19/2025 2:25 PM  Dictation workstation:   [id number]     1. Pulmonary emphysema, unspecified emphysema type (Multi)  Referral to Pulmonology   Current smoker, working on quitting. Declines Wellbutrin/nicotine patches at this point. Denies SOB. Recommend establishing with a pulmonologist   2. Mesenteric panniculitis (Multi)     Denies any infectious symptoms at this time. CBC was normal. Has appointment with GI already   3. Lung granuloma  (Multi)     See pulm emphysema   4. Acute gastritis without hemorrhage, unspecified gastritis type  pantoprazole (ProtoNix) 40 mg EC tablet   Pt went to ER for chest pain, but upon patient showing me where his pain Is, it is more epigastric in nature.  He admits to a poor diet, stressful life. HPI consistent with gastritis. Will start protonix, stop pepcid. He already has an EGD/Colonoscopy scheduled per patient. He already has an appointment with cardiology. His BP was elevated at the ER but is controlled today. Low suspicion for pancreatitis.        Start protonix. Stop pepcid  Keep appointment with GI  Keep appointment with cardiology  Schedule appointment with pulmonology  Schedule physical in three months, sooner as needed.      If symptoms severely worsen or you experience any new concerning symptoms, go to the nearest emergency room or call 911 as needed.

## 2025-07-03 NOTE — PATIENT INSTRUCTIONS
Start protonix. Stop pepcid  Keep appointment with GI  Keep appointment with cardiology  Schedule appointment with pulmonology  Schedule physical in three months, sooner as needed.      If symptoms severely worsen or you experience any new concerning symptoms, go to the nearest emergency room or call 911 as needed.

## 2025-07-09 ENCOUNTER — OFFICE VISIT (OUTPATIENT)
Facility: CLINIC | Age: 45
End: 2025-07-09
Payer: COMMERCIAL

## 2025-07-09 VITALS
BODY MASS INDEX: 28.06 KG/M2 | SYSTOLIC BLOOD PRESSURE: 128 MMHG | OXYGEN SATURATION: 97 % | HEIGHT: 70 IN | DIASTOLIC BLOOD PRESSURE: 75 MMHG | WEIGHT: 196 LBS | HEART RATE: 89 BPM | RESPIRATION RATE: 18 BRPM

## 2025-07-09 DIAGNOSIS — J43.9 PULMONARY EMPHYSEMA, UNSPECIFIED EMPHYSEMA TYPE (MULTI): ICD-10-CM

## 2025-07-09 DIAGNOSIS — J45.20 MILD INTERMITTENT REACTIVE AIRWAY DISEASE WITHOUT COMPLICATION (HHS-HCC): ICD-10-CM

## 2025-07-09 DIAGNOSIS — K65.4 MESENTERIC PANNICULITIS (MULTI): ICD-10-CM

## 2025-07-09 PROCEDURE — 99406 BEHAV CHNG SMOKING 3-10 MIN: CPT | Performed by: NURSE PRACTITIONER

## 2025-07-09 PROCEDURE — 3008F BODY MASS INDEX DOCD: CPT | Performed by: NURSE PRACTITIONER

## 2025-07-09 PROCEDURE — 99203 OFFICE O/P NEW LOW 30 MIN: CPT | Performed by: NURSE PRACTITIONER

## 2025-07-09 RX ORDER — ALBUTEROL SULFATE 90 UG/1
1 INHALANT RESPIRATORY (INHALATION) ONCE
OUTPATIENT
Start: 2025-07-09

## 2025-07-09 RX ORDER — ALBUTEROL SULFATE 90 UG/1
2 INHALANT RESPIRATORY (INHALATION) EVERY 4 HOURS PRN
Qty: 18 G | Refills: 11 | Status: SHIPPED | OUTPATIENT
Start: 2025-07-09 | End: 2025-08-08

## 2025-07-09 RX ORDER — ALBUTEROL SULFATE 0.83 MG/ML
3 SOLUTION RESPIRATORY (INHALATION) ONCE
OUTPATIENT
Start: 2025-07-09 | End: 2025-07-09

## 2025-07-09 ASSESSMENT — ENCOUNTER SYMPTOMS
NERVOUS/ANXIOUS: 0
JOINT SWELLING: 0
HEADACHES: 0
VOMITING: 0
EYE PAIN: 0
FEVER: 0
ABDOMINAL PAIN: 0
MYALGIAS: 0
WEAKNESS: 0
FATIGUE: 0
DIARRHEA: 0
SINUS PRESSURE: 0
VOICE CHANGE: 0
DIZZINESS: 0
NUMBNESS: 0
NAUSEA: 0
BACK PAIN: 0
RHINORRHEA: 0
PALPITATIONS: 0
ARTHRALGIAS: 0
AGITATION: 0

## 2025-07-09 NOTE — PROGRESS NOTES
Patient: Cristobal Singh    [id number]  : 1980 -- AGE 44 y.o.    Provider: CHAI Butt     Location [facility]   Service Date: 2025              [facility]  New Visit Note      HISTORY OF PRESENT ILLNESS     The patient's referring provider is: Jay Levin PA-C    HISTORY OF PRESENT ILLNESS   Cristobal Singh is a 44 y.o. male who presents to a [facility] for an evaluation with concerns of No chief complaint on file.. I have independently interviewed and examined the patient in the office and reviewed available records.    Current History    On today's visit, the patient reports he went into the ER - left sided CP. He states he had been vomiting acid/ liquid. He tried to push through it, but it happened again. They did EKG/ CT / bloodwork. He then saw his PCP related some emphysema. He has on occasional dry cough.  He denies any wheezing, CALVO, SOB at rest, or allergies. He states his CP has resolved over the last few days.  He know he shouldn't smoke and should exercise more - but job is active. He got a PRN albuterol inhaler - old.  GERD under good control with daily pantoprazole.     Previous pulmonary history: He has no history of recurrent infections, or lung disease as a child.  He had no previous lung hx, never on oxygen or inhaler therapy.     Inhalers/nebulized medications: none     Hospitalization History: He has not been hospitalized over the last year for breathing related problem.    Sleep history: He has been told he snores - wakes up feeling rested.      ALLERGIES AND MEDICATIONS     ALLERGIES  Allergies[1]    MEDICATIONS  Current Medications[2]      PAST HISTORY     PAST MEDICAL HISTORY  - GERD     PAST SURGICAL HISTORY  Surgical History[3]    IMMUNIZATION HISTORY    There is no immunization history on file for this patient.    SOCIAL HISTORY  Smokin-44 - 3/4 ppd cutting  "down - down to 10   Alcohol: socially - none since ED visit   Illicit drugs:  marijuana daily before bed - smoking.      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     FAMILY HISTORY  FAMILY HISTORY: No family Hx of lung disease or lung cancer.    RESULTS/DATA     Pulmonary Function Test Results     None on record     Chest Radiograph     XR chest 2 views 01/14/2024  Impression  No airspace consolidation. There is asymmetric increased interstitial  prominence of the bases with subtle superimposed infiltrate not  excluded.    Signed by: Liane Figueroa 1/14/2024 7:12 PM  Dictation workstation:   [id number]      No orders to display        Chest CT Scan     CT angio chest for pulmonary embolism 06/19/2025  Impression  1. No evidence of acute pulmonary embolism.  2. Moderate paraseptal emphysema.      Echocardiogram     None on record       Other testing/ Labs      Eosinophils Absolute (x10*3/uL)   Date Value   06/19/2025 0.05   01/14/2024 0.12   01/12/2024 0.23     No results found for: \"IGE\"    Respiratory Culture  No results found for: \"RESPCULTSM\", \"GRAMSTAIN\"  No results found for the last 90 days.      Fungal Culture  No results found for: \"FUNGALCULTSM\", \"FUNGALSMEAR\"    AFB Culture  No results found for: \"AFBCX\", \"AFBSTAIN\"      REVIEW OF SYSTEMS     REVIEW OF SYSTEMS  Review of Systems   Constitutional:  Negative for fatigue and fever.   HENT:  Negative for congestion, postnasal drip, rhinorrhea, sinus pressure and voice change.    Eyes:  Negative for pain and visual disturbance.   Cardiovascular:  Negative for chest pain, palpitations and leg swelling.   Gastrointestinal:  Negative for abdominal pain, diarrhea, nausea and vomiting.   Endocrine: Negative for cold intolerance and heat intolerance.   Musculoskeletal:  Negative for arthralgias, back pain, joint swelling and myalgias.   Skin:  Negative for rash.   Neurological:  Negative for dizziness, weakness, numbness " and headaches.   Psychiatric/Behavioral:  Negative for agitation. The patient is not nervous/anxious.          PHYSICAL EXAM     VITAL SIGNS: There were no vitals taken for this visit.     CURRENT WEIGHT: [unfilled]  BMI: [unfilled]  PREVIOUS WEIGHTS:  Wt Readings from Last 3 Encounters:   07/03/25 89.5 kg (197 lb 6.4 oz)   06/19/25 91.6 kg (202 lb)   01/14/24 95.3 kg (210 lb)       Physical Exam  Vitals reviewed.   Constitutional:       General: He is not in acute distress.     Appearance: Normal appearance. He is not ill-appearing or toxic-appearing.   HENT:      Head: Normocephalic.      Nose: No rhinorrhea.   Cardiovascular:      Rate and Rhythm: Normal rate and regular rhythm.      Heart sounds: Normal heart sounds.   Pulmonary:      Effort: Pulmonary effort is normal. No respiratory distress.      Breath sounds: Normal breath sounds. No stridor. No wheezing, rhonchi or rales.   Abdominal:      General: Abdomen is flat.   Musculoskeletal:         General: Normal range of motion.      Right lower leg: No edema.      Left lower leg: No edema.   Skin:     General: Skin is warm and dry.      Nails: There is no clubbing.   Neurological:      General: No focal deficit present.      Mental Status: He is alert and oriented to person, place, and time.   Psychiatric:         Mood and Affect: Mood normal.         Behavior: Behavior normal.         Judgment: Judgment normal.         ASSESSMENT/PLAN     Emphysema: seen on CT chest.   - will get baseline PFTs- [phone]   - can try albuterol 2 puffs every 4-6 hours as needed     2. Smoking cessation: he is currently smoking -   - > 5 minutes smoking cessation counseling     Thank you for visiting the Pulmonary clinic today!   Return to clinic 6-12 months or sooner if needed [[facility]]  Maryanne Espinoza CNP  My office -  [phone]- Jayshree is my . Eloisa is my nurse [phone].   Radiology scheduling [phone]   Appointment  scheduling [phone]   Pulmonary function testing - [phone]                    [1] No Known Allergies  [2]   Current Outpatient Medications   Medication Sig Dispense Refill    albuterol 90 mcg/actuation inhaler Inhale 2 puffs every 4 hours if needed for wheezing. 18 g 0    famotidine (Pepcid) 20 mg tablet Take 1 tablet (20 mg) by mouth 2 times a day for 10 days. 20 tablet 0    ibuprofen 600 mg tablet Take 1 tablet (600 mg) by mouth every 6 hours if needed for mild pain (1 - 3).      pantoprazole (ProtoNix) 40 mg EC tablet Take 1 tablet (40 mg) by mouth once daily. Do not crush, chew, or split. 30 tablet 1     No current facility-administered medications for this visit.   [3] No past surgical history on file.

## 2025-07-09 NOTE — PATIENT INSTRUCTIONS
Emphysema: seen on CT chest.   - will get baseline PFTs- [phone]   - can try albuterol 2 puffs every 4-6 hours as needed     2. Smoking cessation: he is currently smoking -   - > 5 minutes smoking cessation counseling     Thank you for visiting the Pulmonary clinic today!   Return to clinic 6-12 months or sooner if needed [[facility]]  Maryanne Espinoza CNP  My office -  [phone]- Jayshree is my . Eloisa is my nurse [phone].   Radiology scheduling [phone]   Appointment scheduling [phone]   Pulmonary function testing - [phone]

## 2025-08-18 ENCOUNTER — APPOINTMENT (OUTPATIENT)
Age: 45
End: 2025-08-18
Payer: COMMERCIAL

## 2025-08-18 VITALS
HEART RATE: 97 BPM | RESPIRATION RATE: 16 BRPM | HEIGHT: 71 IN | OXYGEN SATURATION: 95 % | WEIGHT: 201 LBS | SYSTOLIC BLOOD PRESSURE: 109 MMHG | BODY MASS INDEX: 28.14 KG/M2 | DIASTOLIC BLOOD PRESSURE: 64 MMHG

## 2025-08-18 DIAGNOSIS — J43.9 PULMONARY EMPHYSEMA, UNSPECIFIED EMPHYSEMA TYPE (MULTI): ICD-10-CM

## 2025-08-18 DIAGNOSIS — Z01.818 PRE-OP EVALUATION: Primary | ICD-10-CM

## 2025-08-18 DIAGNOSIS — F17.200 TOBACCO USE DISORDER: ICD-10-CM

## 2025-08-18 PROCEDURE — 99204 OFFICE O/P NEW MOD 45 MIN: CPT | Performed by: INTERNAL MEDICINE

## 2025-08-18 PROCEDURE — 3008F BODY MASS INDEX DOCD: CPT | Performed by: INTERNAL MEDICINE

## 2025-08-18 PROCEDURE — 99407 BEHAV CHNG SMOKING > 10 MIN: CPT | Performed by: INTERNAL MEDICINE

## 2025-08-18 ASSESSMENT — LIFESTYLE VARIABLES
HOW MANY STANDARD DRINKS CONTAINING ALCOHOL DO YOU HAVE ON A TYPICAL DAY: 1 OR 2
AUDIT TOTAL SCORE: 2
HOW OFTEN DO YOU HAVE SIX OR MORE DRINKS ON ONE OCCASION: NEVER
AUDIT-C TOTAL SCORE: 2
HAVE YOU OR SOMEONE ELSE BEEN INJURED AS A RESULT OF YOUR DRINKING: NO
HOW OFTEN DO YOU HAVE A DRINK CONTAINING ALCOHOL: 2-4 TIMES A MONTH
HAS A RELATIVE, FRIEND, DOCTOR, OR ANOTHER HEALTH PROFESSIONAL EXPRESSED CONCERN ABOUT YOUR DRINKING OR SUGGESTED YOU CUT DOWN: NO
SKIP TO QUESTIONS 9-10: 1

## 2025-08-18 ASSESSMENT — ENCOUNTER SYMPTOMS
OCCASIONAL FEELINGS OF UNSTEADINESS: 0
DEPRESSION: 0
LOSS OF SENSATION IN FEET: 0

## 2025-08-18 ASSESSMENT — PAIN SCALES - GENERAL: PAINLEVEL_OUTOF10: 0-NO PAIN
